# Patient Record
(demographics unavailable — no encounter records)

---

## 2024-11-14 NOTE — PHYSICAL EXAM
[Well Developed] : well developed [Well Nourished] : well nourished [No Acute Distress] : no acute distress [Normal Venous Pressure] : normal venous pressure [No Carotid Bruit] : no carotid bruit [Normal S1, S2] : normal S1, S2 [No Murmur] : no murmur [No Rub] : no rub [No Gallop] : no gallop [Clear Lung Fields] : clear lung fields [Good Air Entry] : good air entry [No Respiratory Distress] : no respiratory distress  [Soft] : abdomen soft [Non Tender] : non-tender [No Masses/organomegaly] : no masses/organomegaly [Normal Bowel Sounds] : normal bowel sounds [Normal Gait] : normal gait [No Edema] : no edema [No Cyanosis] : no cyanosis [No Clubbing] : no clubbing [No Varicosities] : no varicosities [No Rash] : no rash [No Skin Lesions] : no skin lesions [Moves all extremities] : moves all extremities [No Focal Deficits] : no focal deficits [Normal Speech] : normal speech [Alert and Oriented] : alert and oriented [Normal memory] : normal memory [General Appearance - Well Developed] : well developed [Normal Appearance] : normal appearance [Well Groomed] : well groomed [General Appearance - Well Nourished] : well nourished [No Deformities] : no deformities [General Appearance - In No Acute Distress] : no acute distress [Normal Conjunctiva] : the conjunctiva exhibited no abnormalities [Eyelids - No Xanthelasma] : the eyelids demonstrated no xanthelasmas [Normal Oral Mucosa] : normal oral mucosa [No Oral Pallor] : no oral pallor [No Oral Cyanosis] : no oral cyanosis [Respiration, Rhythm And Depth] : normal respiratory rhythm and effort [Exaggerated Use Of Accessory Muscles For Inspiration] : no accessory muscle use [Auscultation Breath Sounds / Voice Sounds] : lungs were clear to auscultation bilaterally [Heart Rate And Rhythm] : heart rate and rhythm were normal [Heart Sounds] : normal S1 and S2 [Murmurs] : no murmurs present [Edema] : no peripheral edema present [Abdomen Soft] : soft [Abdomen Tenderness] : non-tender [Abnormal Walk] : normal gait [Gait - Sufficient For Exercise Testing] : the gait was sufficient for exercise testing [Nail Clubbing] : no clubbing of the fingernails [Cyanosis, Localized] : no localized cyanosis [Skin Color & Pigmentation] : normal skin color and pigmentation [Skin Turgor] : normal skin turgor [] : no rash [Oriented To Time, Place, And Person] : oriented to person, place, and time [Affect] : the affect was normal [Mood] : the mood was normal [Memory Recent] : recent memory was not impaired [No Anxiety] : not feeling anxious [FreeTextEntry1] : No Carotid bruit. No JVD

## 2024-11-14 NOTE — REASON FOR VISIT
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.  Seen by advanced heart failure team Dr Rincon. Patient will repear cardiac PET scan in the new year to reassess FDG uptake. - AL amyloid negative on 8/16/24 - MARLENE normal (done with Dr. King at [facility]). -Initial echo with mild LVH of 1.3cm and intracavitary gradient. - PYP amyloid scan 7/18/24 negative with grade 0 uptake and H:CL ratio of 0.81.  Patient has episodes of dyspnea on exertion which he attributes to sarcoid lung disease.  Patient has fatigue. Cardiovascular review of symptoms is negative for exertional chest pain, palpitations, dizziness or syncope.  No PND or orthopnea leg edema.  No bleeding or black stool.  No exercise routine.  Patient is walking 15 minutes on occasion.  Patient states he is active playing golf walking the course.  Exercise stress echo June 2022 LVEF 60%, normal exercise wall motion, abnormal EKG response, no chest pain, hypertensive blood pressure response, 1 1% MPHR, 10 minutes Chad protocol.  Low risk coronary calcium score 74 total, incidental finding of liver cyst 2 cm, patient reports history of liver cyst followed in the past.  Echocardiogram May 2022 LVEF 60%, moderate MR, normal RVSP.  Carotid and abdominal ultrasound May 2022, mild nonobstructive plaque, normal abdominal aortic size.  EKG 10/12/2020 sinus rhythm 61 bpm normal tracing  Echocardiogram October 2019, LVEF 65%, mild MR and TR, mild pulmonary hypertension, sigmoid septum 1.3 cm  Exercise treadmill stress test October 2019, nonischemic EKG response, no chest pain, 101% MPHR, 11 minutes 58 seconds of Chad protocol, baseline sinus rhythm normal tracing  Holter monitor October 2019 sinus rhythm  average heart rate 76 bpm, rare PACs and PVCs
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
## 2024-11-14 NOTE — DISCUSSION/SUMMARY
[FreeTextEntry1] : Patient has medical history detailed above and active medical issues including:  - Self referred for cardiac amyloid evaluation. Seen by advanced heart failure team Dr Rincon. Patient will repeat cardiac PET scan in the new year to reassess FDG uptake. - AL amyloid negative on 8/16/24 - MARLENE normal (done with Dr. King at [facility]). -Initial echo with mild LVH of 1.3cm and intracavitary gradient. - PYP amyloid scan 7/18/24 negative with grade 0 uptake and H:CL ratio of 0.81.  - Hospital follow-up [facility] July 2023 palpitations rapid AF converting to sinus rhythm with rate control, discharged home on Eliquis, Toprol, losartan. AF ablation 8/22/2023 [facility] Dr. Nesbitt on Eliquis, Toprol. Patient declined Zio patch heart monitor.   - No further chest pain.  Prior atypical chest pain, dyspnea on exertion, fatigue, multiple CAD risk factors.  Normal Lexiscan Myoview stress test with normal LVEF at [facility] Aug 2024.  - Palpitations, rare PACs and PVCs normal LVEF echo July 2024  - Hyperlipidemia, patient stopped Crestor June 2023 repeat labs ordered.  Patient lost 17 pounds with diet and exercise  - Post 911 exposure, , Sarcoid and MGUS, mediastinal lymph nodes, several pulmonary and liver nodules, hepatic cyst work-up in the past.   Advised patient to follow active lifestyle with regular cardiovascular exercise. Patient educated on lifestyle and diet modification with low sodium low fat diet and avoidance of excessive alcohol. Patient is aware to call with any symptoms or concerns.   Patient will be seen in cardiology follow-up 6 months.with echocardiogram  Jerald will followup with Dr. Carolyn Garcia for primary care  Total time spent 45 minutes, reviewing of test results, chart information, patient discussion, physical exam and completion of chart documentation.

## 2024-11-14 NOTE — DISCUSSION/SUMMARY
[FreeTextEntry1] : Patient has medical history detailed above and active medical issues including:  - Self referred for cardiac amyloid evaluation. Seen by advanced heart failure team Dr Rincon. Patient will repeat cardiac PET scan in the new year to reassess FDG uptake. - AL amyloid negative on 8/16/24 - MARLENE normal (done with Dr. King at [facility]). -Initial echo with mild LVH of 1.3cm and intracavitary gradient. - PYP amyloid scan 7/18/24 negative with grade 0 uptake and H:CL ratio of 0.81.  - Hospital follow-up [facility] July 2023 palpitations rapid AF converting to sinus rhythm with rate control, discharged home on Eliquis, Toprol, losartan. AF ablation 8/22/2023 [facility] Dr. Nesbitt on Eliquis, Toprol. Patient declined Zio patch heart monitor.   - No further chest pain.  Prior atypical chest pain, dyspnea on exertion, fatigue, multiple CAD risk factors.  Normal Lexiscan Myoview stress test with normal LVEF at [facility] Aug 2024.  - Palpitations, rare PACs and PVCs normal LVEF echo July 2024  - Hyperlipidemia, patient stopped Crestor June 2023 repeat labs ordered.  Patient lost 17 pounds with diet and exercise  - Post 911 exposure, , Sarcoid and MGUS, mediastinal lymph nodes, several pulmonary and liver nodules, hepatic cyst work-up in the past.   Advised patient to follow active lifestyle with regular cardiovascular exercise. Patient educated on lifestyle and diet modification with low sodium low fat diet and avoidance of excessive alcohol. Patient is aware to call with any symptoms or concerns.   Patient will be seen in cardiology follow-up 6 months.with echocardiogram  Jerald will followup with Dr. Carolyn Garcia for primary care  Total time spent 45 minutes, reviewing of test results, chart information, patient discussion, physical exam and completion of chart documentation.

## 2025-01-08 NOTE — CARDIOLOGY SUMMARY
[de-identified] : 1/8/2025: NSR, HR 61, first-degree AV block, early R wave transition. 9/4/24: NSR, HR 60, no ischemic changes.  5/30/2024: NSR with PAC, HR 90, early R wave transition, low voltage limb leads. [de-identified] : 7/1824: EF 77%, LVEDD 4.3 cm, IVSD 1.3 cm, PWD 1.3 cm, hyperdynamic EF and intra cavitary gradient of 39 mmHg, mild-mod MR, mild TR, PASP 40 mmHg. 5/17/2023 ([facility]): LVEF 61%, LVEDD 4.67 cm, IVSD 1.17 cm, normal RV, AV not well-visualized, trace MR, trace TR, normal aortic size. [de-identified] : cMRI 8/22/24 (on pred 10mg daily): LVEF 69%, LVEDV 148mL, RV EF 66%, RVEDV 150mL, mild MR RF 19%, no LGE.  CCTA 8/21/2023: Coronary artery quantification not possible.  Subcentimeter pulmonary nodules.  No anomalous pulmonary venous drainage, negative for AAA thrombus. [de-identified] : Cardiac PET 8/28/24 (on pred 10mg daily): LVEF 68%, high intensity uptake in a small myocardial segment of the basal infero-lateral and vaso lateral wall suggesting inflammation. PYP Amyloid Scan 7/18/24: Grade 0 uptake, H: CL ratio of 0.81.   PET 3/8/2013: No FDG uptake.  Several small subcentimeter lung nodules.  Hypermetabolic adenopathy in the chest and lungs.

## 2025-01-08 NOTE — DISCUSSION/SUMMARY
[EKG obtained to assist in diagnosis and management of assessed problem(s)] : EKG obtained to assist in diagnosis and management of assessed problem(s) [FreeTextEntry1] : # Cardiomyopathy  - ETIOLOGY: -Patient has a prior diagnosis of MGUS and lung sarcoid. -Cardiac MRI with no resting defects or LGE suggestive of cardiac sarcoid.  PET scan with no resting perfusion abnormalities however there was FDG uptake in the basal inferolateral wall and basal lateral wall suggestive of possible inflammation.   In the absence of a corresponding resting defect, it is unclear if this truly represents an abnormality from cardiac sarcoid.  In addition, patient has been on prednisone 10 mg daily for approximately 8 months and may represent a partially treated cardiac sarcoid. -We again discussed repeating cardiac PET scan to reassess FDG uptake.  Patient would like to avoid any additional radiation including PET scan.  He defers.  He is open to doing repeat echo which we will order. - AL amyloid negative on 8/16/24 - MARLENE normal (done with Dr. King at [facility]). -Initial echo with mild LVH of 1.3cm and intracavitary gradient. - PYP amyloid scan 7/18/24 negative with grade 0 uptake and H:CL ratio of 0.81.   - TARGETED THERAPY: - continue on losartan 25 mg daily and metoprolol succinate 25 mg XL nightly.   - Can consider addition of SGLT2 inhibitor for any dyspnea.   - DIURETICS: -Relatively euvolemic on exam.  Not on standing diuretics.   - LABS: -5/21/2024: K4.6, Cr 1.04, normal liver enzymes, EGFR 76. -6/25/2024: K4.3, Cr 0.89, Mg 2.2. -Will obtain labs from [facility].  # pAF -Currently rate is well-controlled off of any AV asaf agents. -AC: Continue on Eliquis 5 mg twice daily. -Followed by [place] EP team.  #PAD screening, claudication -Symptoms resolved on his own.  I had previously offered KALEB and bilateral lower extremity arterial ultrasounds however patient never had this done and does not want to pursue this.   F/U with me in 3 months + echo.

## 2025-01-08 NOTE — PHYSICAL EXAM
[Normal S1, S2] : normal S1, S2 [Murmur] : murmur [Normal] : soft, non-tender, no masses/organomegaly, normal bowel sounds [No Edema] : no edema [No Rash] : no rash [Alert and Oriented] : alert and oriented [de-identified] : JVP at clavicle [de-identified] : 3/6 at left and right upper sternal boarder [de-identified] : Normal gait, small joint arthritis in hands [de-identified] : Warm

## 2025-01-08 NOTE — ASSESSMENT
[FreeTextEntry1] : 71 yo M with pAF s/p ablation, sarcoid, MGUS, HLD, and rheumatoid arthritis presents today for heart failure follow-up.  He has ACC/AHA stage B-C cardiomyopathy with NYHA class I symptoms.

## 2025-01-08 NOTE — REASON FOR VISIT
[Structural Heart and Valve Disease] : structural heart and valve disease [FreeTextEntry1] : Primary cardiologist: Dr. Valentino EP: Dr. Nesbitt [facility]
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
## 2025-06-24 NOTE — DISCUSSION/SUMMARY
[FreeTextEntry1] : Patient has medical history detailed above and active medical issues including:  - Self referred for cardiac amyloid evaluation. Seen by advanced heart failure team Dr Rincon. Patient will repeat cardiac PET scan in the new year to reassess FDG uptake. - AL amyloid negative on 8/16/24 - MARLENE normal (done with Dr. King at [facility]). -Initial echo with mild LVH of 1.3cm and intracavitary gradient. - PYP amyloid scan 7/18/24 negative with grade 0 uptake and H:CL ratio of 0.81.  - Hospital follow-up [facility] July 2023 palpitations rapid AF converting to sinus rhythm with rate control, discharged home on Eliquis, Toprol, losartan. AF ablation 8/22/2023 [facility] Dr. Nesbitt on Eliquis, Toprol. Patient declined Zio patch heart monitor. Patient stopped Eliquis due to severe anemia.   - No further chest pain.  Prior atypical chest pain, dyspnea on exertion, fatigue, multiple CAD risk factors.  Normal Lexiscan Myoview stress test with normal LVEF at [facility] Aug 2024.  - Palpitations, rare PACs and PVCs normal LVEF echo July 2024  - Hyperlipidemia, patient stopped Crestor June 2023 repeat labs ordered.  Patient lost 17 pounds with diet and exercise  - Post 911 exposure, , Sarcoid and MGUS, mediastinal lymph nodes, several pulmonary and liver nodules, hepatic cyst work-up in the past.   Advised patient to follow active lifestyle with regular cardiovascular exercise. Patient educated on lifestyle and diet modification with low sodium low fat diet and avoidance of excessive alcohol. Patient is aware to call with any symptoms or concerns.   Patient will be seen in cardiology follow-up 6 months.  Jerald will followup with Dr. Carolyn Garcia for primary care  Total time spent 45 minutes, reviewing of test results, chart information, patient discussion, physical exam and completion of chart documentation.

## 2025-06-24 NOTE — REASON FOR VISIT
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  Seen by advanced heart failure team Dr Rincon. Patient will repeat cardiac PET scan in the new year to reassess FDG uptake. - AL amyloid negative on 8/16/24 - MARLENE normal (done with Dr. King at [facility]). -Initial echo with mild LVH of 1.3cm and intracavitary gradient. - PYP amyloid scan 7/18/24 negative with grade 0 uptake and H:CL ratio of 0.81.  Patient has episodes of dyspnea on exertion which he attributes to sarcoid lung disease.  Patient has fatigue. Cardiovascular review of symptoms is negative for exertional chest pain, palpitations, dizziness or syncope.  No PND or orthopnea leg edema.  No bleeding or black stool.  No exercise routine.  Patient is walking 15 minutes on occasion.  Patient states he is active playing golf walking the course.  Exercise stress echo June 2022 LVEF 60%, normal exercise wall motion, abnormal EKG response, no chest pain, hypertensive blood pressure response, 1 1% MPHR, 10 minutes Chad protocol.  Low risk coronary calcium score 74 total, incidental finding of liver cyst 2 cm, patient reports history of liver cyst followed in the past.  Echocardiogram May 2022 LVEF 60%, moderate MR, normal RVSP.  Carotid and abdominal ultrasound May 2022, mild nonobstructive plaque, normal abdominal aortic size.  EKG 10/12/2020 sinus rhythm 61 bpm normal tracing  Echocardiogram October 2019, LVEF 65%, mild MR and TR, mild pulmonary hypertension, sigmoid septum 1.3 cm  Exercise treadmill stress test October 2019, nonischemic EKG response, no chest pain, 101% MPHR, 11 minutes 58 seconds of Chad protocol, baseline sinus rhythm normal tracing  Holter monitor October 2019 sinus rhythm  average heart rate 76 bpm, rare PACs and PVCs

## 2025-06-24 NOTE — DISCUSSION/SUMMARY
[FreeTextEntry1] : Patient has medical history detailed above and active medical issues including:  - Self referred for cardiac amyloid evaluation. Seen by advanced heart failure team Dr Rincon. Patient will repeat cardiac PET scan in the new year to reassess FDG uptake. - AL amyloid negative on 8/16/24 - MARLENE normal (done with Dr. King at [facility]). -Initial echo with mild LVH of 1.3cm and intracavitary gradient. - PYP amyloid scan 7/18/24 negative with grade 0 uptake and H:CL ratio of 0.81.  - Hospital follow-up [facility] July 2023 palpitations rapid AF converting to sinus rhythm with rate control, discharged home on Eliquis, Toprol, losartan. AF ablation 8/22/2023 [facility] Dr. Nesbitt on Eliquis, Toprol. Patient declined Zio patch heart monitor. Patient stopped Eliquis due to severe anemia.   - No further chest pain.  Prior atypical chest pain, dyspnea on exertion, fatigue, multiple CAD risk factors.  Normal Lexiscan Myoview stress test with normal LVEF at [facility] Aug 2024.  - Palpitations, rare PACs and PVCs normal LVEF echo July 2024  - Hyperlipidemia, patient stopped Crestor June 2023 repeat labs ordered.  Patient lost 17 pounds with diet and exercise  - Post 911 exposure, , Sarcoid and MGUS, mediastinal lymph nodes, several pulmonary and liver nodules, hepatic cyst work-up in the past.   Advised patient to follow active lifestyle with regular cardiovascular exercise. Patient educated on lifestyle and diet modification with low sodium low fat diet and avoidance of excessive alcohol. Patient is aware to call with any symptoms or concerns.   Patient will be seen in cardiology follow-up 6 months.  Jerald will followup with Dr. Carolyn Garcia for primary care  Total time spent 45 minutes, reviewing of test results, chart information, patient discussion, physical exam and completion of chart documentation.
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
## 2025-07-11 NOTE — DISCUSSION/SUMMARY
[EKG obtained to assist in diagnosis and management of assessed problem(s)] : EKG obtained to assist in diagnosis and management of assessed problem(s) [FreeTextEntry1] : # Cardiomyopathy  - ETIOLOGY: - Has mild - mod LVH on echo.  This may be secondary to hypertensive heart disease or infiltrative cardiomyopathy.  However, workup thus far has been negative for both amyloid and cardaic sarcoid. -Patient has a prior diagnosis of MGUS and lung sarcoid. -Cardiac MRI with no resting defects or LGE suggestive of cardiac sarcoid.  PET scan with no resting perfusion abnormalities however there was FDG uptake in the basal inferolateral wall and basal lateral wall suggestive of possible inflammation.   In the absence of a corresponding resting defect, it is unclear if this truly represents an abnormality from cardiac sarcoid.  In addition, patient has been on prednisone 10 mg daily for approximately 8 months and may represent a partially treated cardiac sarcoid. -We again discussed repeating surveillance testing with cardiac PET and MRI.  He is previously against this.  However, today he tells me he would like to repeat a cardiac MRI.  Will order this.   - AL amyloid negative on 8/16/24 - MARLENE normal (done with Dr. King at [facility]).  If LGE or scarring present on cardiac MRI, will need to discuss with hematology if this will change their approach to MGUS. - echo reviewed and discussed with patient from 5/22/2025: With mild-mod LVH of 1.3-1.4cm and intracavitary gradient. - PYP amyloid scan 7/18/24 negative with grade 0 uptake and H:CL ratio of 0.81.   - TARGETED THERAPY: - continue metoprolol succinate 25 mg XL nightly.   - INCREASE losartan to 50mg daily.  Patient to obtain repeat labs 1 week after dose increase.  Prescription given.  He will do this with his hematologist. - Can consider addition of SGLT2 inhibitor for any dyspnea.   - DIURETICS: -Relatively euvolemic on exam.  Not on standing diuretics.   - LABS: -5/21/2024: K4.6, Cr 1.04, normal liver enzymes, EGFR 76. -6/25/2024: K4.3, Cr 0.89, Mg 2.2. - 5/1/2025: K4.1, creatinine 1.08, normal liver enzymes, K/L ratio of 3.08.  # pAF -Currently rate is well-controlled. -AC: Continue on Eliquis 5 mg twice daily. -Followed by [facility] team.  #PAD screening, claudication -Symptoms resolved on his own.  I had previously offered KALEB and bilateral lower extremity arterial ultrasounds however patient never had this done and does not want to pursue this.   F/U with me after [facility].

## 2025-07-11 NOTE — REASON FOR VISIT
[Structural Heart and Valve Disease] : structural heart and valve disease [FreeTextEntry1] : Primary cardiologist: Dr. Valentino EP: Dr. Nesbitt [facility]

## 2025-07-11 NOTE — CARDIOLOGY SUMMARY
[de-identified] : 7/11/2025: NSR, HR 70, first-degree AV block, no ischemic changes. 1/8/2025: NSR, HR 61, first-degree AV block, early R wave transition. 9/4/24: NSR, HR 60, no ischemic changes.  5/30/2024: NSR with PAC, HR 90, early R wave transition, low voltage limb leads. [de-identified] : 5/22/2025: LVEF 69%, LVEDD 3.9 cm, IVSd 1.4 cm, PWD 1.3 cm, sigmoid septum, hyper dynamic EF no LVOT obstruction or intracavitary gradient (max gradient 7mmHG), mild MR, mild TR, PASP 41 mmHg, normal IVC. 7/1824: EF 77%, LVEDD 4.3 cm, IVSD 1.3 cm, PWD 1.3 cm, hyperdynamic EF and intra cavitary gradient of 39 mmHg, mild-mod MR, mild TR, PASP 40 mmHg. 5/17/2023 ([facility]): LVEF 61%, LVEDD 4.67 cm, IVSD 1.17 cm, normal RV, AV not well-visualized, trace MR, trace TR, normal aortic size. [de-identified] : cMRI 8/22/24 (on pred 10mg daily): LVEF 69%, LVEDV 148mL, RV EF 66%, RVEDV 150mL, mild MR RF 19%, no LGE.  CCTA 8/21/2023: Coronary artery quantification not possible.  Subcentimeter pulmonary nodules.  No anomalous pulmonary venous drainage, negative for AAA thrombus. [de-identified] : Cardiac PET 8/28/24 (on pred 10mg daily): LVEF 68%, high intensity uptake in a small myocardial segment of the basal infero-lateral and baso lateral wall suggesting inflammation. PYP Amyloid Scan 7/18/24: Grade 0 uptake, H: CL ratio of 0.81.   PET 3/8/2013: No FDG uptake.  Several small subcentimeter lung nodules.  Hypermetabolic adenopathy in the chest and lungs.
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
## 2025-07-11 NOTE — PHYSICAL EXAM
[Normal S1, S2] : normal S1, S2 [Murmur] : murmur [Normal] : soft, non-tender, no masses/organomegaly, normal bowel sounds [No Edema] : no edema [No Rash] : no rash [Alert and Oriented] : alert and oriented [de-identified] : JVP at clavicle [de-identified] : 3/6 at left and right upper sternal boarder [de-identified] : Normal gait, small joint arthritis in hands [de-identified] : Warm

## 2025-07-11 NOTE — ASSESSMENT
[FreeTextEntry1] : 72 yo M with pAF s/p ablation, sarcoid, MGUS, HLD, and rheumatoid arthritis presents today for heart failure follow-up.  He has ACC/AHA stage B-C cardiomyopathy with NYHA class I symptoms.